# Patient Record
Sex: FEMALE
[De-identification: names, ages, dates, MRNs, and addresses within clinical notes are randomized per-mention and may not be internally consistent; named-entity substitution may affect disease eponyms.]

---

## 2019-03-24 ENCOUNTER — HOSPITAL ENCOUNTER (EMERGENCY)
Dept: HOSPITAL 80 - FED | Age: 69
Discharge: HOME | End: 2019-03-24
Payer: COMMERCIAL

## 2019-03-24 ENCOUNTER — HOSPITAL ENCOUNTER (OUTPATIENT)
Dept: HOSPITAL 80 - BMCIMAGING | Age: 69
End: 2019-03-24
Attending: FAMILY MEDICINE
Payer: COMMERCIAL

## 2019-03-24 VITALS — SYSTOLIC BLOOD PRESSURE: 132 MMHG | DIASTOLIC BLOOD PRESSURE: 82 MMHG
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
## 2019-03-24 DIAGNOSIS — S42.92XA: Primary | ICD-10-CM
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
## 2019-03-24 DIAGNOSIS — S43.015A: Primary | ICD-10-CM
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
## 2019-03-24 PROCEDURE — 23650 CLTX SHO DSLC W/MNPJ WO ANES: CPT

## 2019-03-24 PROCEDURE — 99284 EMERGENCY DEPT VISIT MOD MDM: CPT

## 2019-03-24 PROCEDURE — 73030 X-RAY EXAM OF SHOULDER: CPT

## 2019-03-24 PROCEDURE — 96374 THER/PROPH/DIAG INJ IV PUSH: CPT

## 2019-03-24 PROCEDURE — 0RSKXZZ REPOSITION LEFT SHOULDER JOINT, EXTERNAL APPROACH: ICD-10-PCS

## 2019-03-24 NOTE — EDPHY
H & P


Stated Complaint: Dislocated left shoulder and injury to left arm this 

afternoon.


Time Seen by Provider: 03/24/19 18:06


HPI/ROS: 


Chief complaint:  Left shoulder injury





History of present illness:  This is a 68-year-old female who presents to the 

emergency department for left shoulder injury.  Patient was playing basketball 

with her grandson today when she fell and struck her left shoulder against a 

car.  Since then she has had pain and inability to move it.  She reports some 

numbness over the outer aspect of the shoulder.  She went to urgent care, x-

rays were taken and she was referred here for fracture dislocation.  Patient 

denies open wounds or abnormal coolness to the left upper extremity.  She 

denies trauma to other parts of the body including head, neck, back, chest 

abdomen or other extremities.





Review of systems:  A 10 point review of systems was obtained and other than 

described above was negative








- Personal History


Current Tetanus Diphtheria and Acellular Pertussis (TDAP): Yes





- Medical/Surgical History


Hx Asthma: No


Hx Chronic Respiratory Disease: No


Hx Diabetes: No


Hx Cardiac Disease: No


Hx Renal Disease: No


Hx Cirrhosis: No


Hx Alcoholism: No


Hx HIV/AIDS: No


Hx Splenectomy or Spleen Trauma: No


Other PMH: Denies





- Social History


Smoking Status: Never smoked





- Physical Exam


Exam: 





General Appearance:  Alert, nontoxic


Eyes:  PERRLA.  No raccoon eyes.


ENT:  No hemotympanum.  No Webster sign.


Respiratory:  Lungs clear to auscultation bilaterally.  


Cardiovascular: Regular rate and rhythm.  Radial pulses 2+.


Neurological:  Alert and oriented.  Strength and sensation intact and 

symmetrical except for the left shoulder where she has decreased strength due 

to pain.


Skin:  No open wounds to the left upper extremity.


Musculoskeletal:  The head is nontender without crepitus or bony deformity.  

The spine is nontender to palpation along its entire length.  There is no 

crepitus, bony deformity or step-off.  Chest wall intact palpation.  Left 

shoulder is tender diffusely.  She does not want move it secondary to pain.  

Elbow, forearm, wrist and hand are unremarkable and she can move the elbow, 

wrist and digits well.  





Constitutional: 


 Initial Vital Signs











Temperature (C)  36.6 C   03/24/19 17:49


 


Heart Rate  96   03/24/19 17:49


 


Respiratory Rate  16   03/24/19 17:49


 


Blood Pressure  177/101 H  03/24/19 17:49


 


O2 Sat (%)  90 L  03/24/19 17:49








 











O2 Delivery Mode               Room Air


 


O2 (L/minute)                  4














Allergies/Adverse Reactions: 


 





amoxicillin Allergy (Verified 03/24/19 17:52)


 








Home Medications: 














 Medication  Instructions  Recorded


 


Hydrocodone/APAP 5/325 [Norco 1 tab PO Q6H #6 tab 03/24/19





5/325 (*)]  


 


Zoloft 100mg (*)  03/24/19














Medical Decision Making





- Diagnostics


Imaging Results: 


 Imaging Impressions





Shoulder X-Ray  03/24/19 18:35


Impression:  Normal glenohumeral alignment. Cortical irregularity the greater 

tuberosity suggests nondisplaced fracture injury. 











Imaging: I viewed and interpreted images myself


Procedures: 





Procedure: Dislocation reduction.


The shoulder was reduced in the usual fashion without complications.  Post 

reduction the patient's neurovascular exam is normal.


Post reduction x-ray demonstrates reduction of the joint to the anatomic 

position. 


The procedure was performed by myself.





Patient was placed in a sling and swath.


ED Course/Re-evaluation: 





Patient seen under the supervision of my secondary supervising physician Dr. Laughlin McCollester.  Patient presents for a left shoulder injury.  X-ray 

obtained at urgent care shows a fracture dislocation.  Dislocation is reduced.  

Post reduction shows good alignment but with a greater tuberosity fracture. She 

has good vascular flow to the left arm.  Both pre and post reduction she has 

some numbness over the left deltoid region otherwise she is neurologically 

intact in the rest of the left upper extremity.  By history and physical exam I 

do not appreciate further trauma.  Patient is discharged home.  Home care 

including pain management is discussed.  She is referred to Orthopedics for 

recheck.  Return precautions are given.  The patient voiced understanding and 

agreement with plan.


Differential Diagnosis: 





Included but not limited to dislocation, fracture, sprain or strain, contusion





- Data Points


Medications Given: 


 








Discontinued Medications





Hydrocodone Bitart/Acetaminophen (Norco 5/325mg Prepack#6)  1 btl TAKEHOME 

EDNOW ONE


   Stop: 03/24/19 19:10


   Last Admin: 03/24/19 19:14 Dose:  1 btl


Fentanyl (Sublimaze)  50 mcg IVP EDNOW ONE


   Stop: 03/24/19 18:21


   Last Admin: 03/24/19 18:23 Dose:  50 mcg


Fentanyl (Sublimaze)  50 mcg IVP EDNOW ONE


   Stop: 03/24/19 18:21


   Last Admin: 03/24/19 18:29 Dose:  50 mcg








Departure





- Departure


Disposition: Home, Routine, Self-Care


Clinical Impression: 


Fracture dislocation of shoulder joint


Qualifiers:


 Encounter type: initial encounter Fracture type: closed Laterality: left 

Qualified Code(s): S42.92XA - Fracture of left shoulder girdle, part unspecified

, initial encounter for closed fracture





Condition: Good


Instructions:  Shoulder Dislocation (ED), How to Use a Sling (ED)


Additional Instructions: 


Follow-up with orthopedics for continued evaluation and care





In regards to pain control see the following:


 Use ibuprofen 400 mg 3 times a day for the next 2-3 days for pain


 In addition You have been prescribed Norco for pain. Norco contains Tylenol, 

do not take extra Tylenol/acetaminophen/Apap with it.  It is sedating.





Wear sling at all times until told otherwise by Orthopedics





If symptoms worsen or new symptoms develop return to the emergency room for 

recheck


Referrals: 


ELIZABETH LOMELI [Primary Care Provider] - As per Instructions


Escobar Schroeder MD [Medical Doctor] - As per Instructions


Prescriptions: 


Hydrocodone/APAP 5/325 [Norco 5/325 (*)] 1 tab PO Q6H #6 tab